# Patient Record
Sex: FEMALE | URBAN - METROPOLITAN AREA
[De-identification: names, ages, dates, MRNs, and addresses within clinical notes are randomized per-mention and may not be internally consistent; named-entity substitution may affect disease eponyms.]

---

## 2021-02-23 ENCOUNTER — MEDICAL CORRESPONDENCE (OUTPATIENT)
Dept: HEALTH INFORMATION MANAGEMENT | Facility: CLINIC | Age: 41
End: 2021-02-23

## 2021-02-24 DIAGNOSIS — Q28.2 CEREBRAL AVM: Primary | ICD-10-CM

## 2021-02-25 ENCOUNTER — TELEPHONE (OUTPATIENT)
Dept: NEUROSURGERY | Facility: CLINIC | Age: 41
End: 2021-02-25

## 2021-03-03 ENCOUNTER — PRE VISIT (OUTPATIENT)
Dept: NEUROSURGERY | Facility: CLINIC | Age: 41
End: 2021-03-03

## 2021-03-03 NOTE — TELEPHONE ENCOUNTER
FUTURE VISIT INFORMATION      FUTURE VISIT INFORMATION:    Date: 3/9/2021    Time: 320pm    [place] [facility] [place]ERR[place] INFORMATION:    Referring provider:  Dr. Florian     Referring providers clinic:  [facility]     Reason for visit/diagnosis  AVM     RECORDS REQUESTED FROM:       Clinic name Comments Records Status Imaging Status   [facility]    [facility] Head-1/13/2021    MR Brain-1/13/2021 Care Everywhere Requested to PACS                                   3/3/2021-Request for Images faxed to [facility]-MR @ 319pm    3/9/2021-[facility] Images now in PACS-MR @ 602am

## 2021-03-09 ENCOUNTER — VIRTUAL VISIT (OUTPATIENT)
Dept: NEUROSURGERY | Facility: CLINIC | Age: 41
End: 2021-03-09
Payer: COMMERCIAL

## 2021-03-09 DIAGNOSIS — Q27.30 AVM (ARTERIOVENOUS MALFORMATION): Primary | ICD-10-CM

## 2021-03-09 PROCEDURE — 99204 OFFICE O/P NEW MOD 45 MIN: CPT | Mod: TEL | Performed by: NEUROLOGICAL SURGERY

## 2021-03-09 NOTE — LETTER
3/9/2021       RE: Malissa Villegas  [address]  [place] [address]     Dear Colleague,    Thank you for referring your patient, Malissa Villegas, to the [facility]UROSUR[facility] [place] at [facility]. Please see a copy of my visit note below.    I had a phone visit with Malissa.  She gave consent for this visit.    Visit duration: 1 hour    Please see dictation for visit details.        Follow up phone call 9/10/2021    This morning I reviewed Malissa's case at our cerebrovascular case conference.  Collectively the group felt that the safest treatment of this AVM was radiosurgery with the intent for cure.  Based on the size of the AVM we would likely perform radiosurgery in two stages.  Ultimately we would expect a 50-70% chance of cure.  If after radiosurgery and time the AVM persisted we would then consider surgical treatment.      At this point with the location of the AVM immediately adjacent to the motor strip we feel there is a real possibility that following surgery she would have transient or even permanent paralysis.  Radiosurgery could provide a possible cure for this AVM.  If it did not we would anticipate the AVM would shrink and if needed any future surgery would then have less risk of resulting in paralysis.    I have relayed all of this to Malissa this morning.  I have told her that we would be happy to participate in the treatment of her AVM but feel that it is good for her to obtain multiple opinions like she is doing.  If after obtaining those opinions she would like to proceed with care at the [facility] she should schedule a follow up appointment with me at which point we will discuss logistics of our treatment plan.    Overall I spent approximately 15 min on the phone with Malissa.    Service Date: 03/09/2021      TELEPHONE VISIT       NEUROSURGERY NOTE      HISTORY OF PRESENT ILLNESS:  I had the pleasure  to talk to Malissa today by telephone during a neurosurgical phone clinic visit.  She gave consent for this visit.      Malissa is a 40-year-old woman originally from [place] who still lives over there.  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      She had been in her normal state of health until more recently when she started having some vision problems.  This was worked up and during that workup was found to have an AVM on MRI.  In retrospect, she does note that over the last couple of years, she has had a couple of spells in which she has blacked out, and she questions whether or not this could be related to the AVM.  She has not had any overt signs of seizures; however, having an AVM in the location in which she does, it is not unusual to have seizures.  She otherwise is apparently asymptomatic from this AVM.      Today I reviewed her MRI that was obtained at [facility] on 01/13/2021, and then I have also reviewed her angiogram that was obtained on 02/18/2021.  The MRI on T2 imaging demonstrates an AVM that is located in the premotor cortex immediately adjacent to the motor cortex.  The AVM appears to extend from the cortical surface down to the lateral ventricle.  The cerebral angiogram demonstrates that there is arterial flow from both the anterior cerebral artery and from the middle cerebral artery.  There are a number of veins draining the AVM, and these all appear to be superficial in nature.  The AVM on the angiogram measures 2.9 cm x 2.2 cm.  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  The supplementary grading scale would add an additional 2 points for being age 40.      I have explained to Malissa that AVMs have about a 4% rate of rupture annually.  Otherwise, this appears to be an asymptomatic AVM.  Treatment  options would include observation, surgical resection, radiosurgery or embolization.  Given the size of the AVM and the complexity of it, I do not feel that an embolization is a reasonable treatment option.  Based on the size and anatomy of the AVM, surgical resection is very reasonable.  However, one needs to take into consideration that this is in the premotor area immediately adjacent to the motor fibers.  As such, I think that there is a real risk of temporary weakness and then even some risk of permanent paralysis.  Alternatively, radiosurgery would be reasonable.  I have told her that it would be ideal if radiosurgery would eliminate the entire AVM; however, there is a chance that it may not, in which point surgical resection would then be planned.  She is going to think about these options and also has an appointment with Dr. Zachery Espinosa tomorrow at [facility].  I believe she may also be seeking an opinion at the [facility].  I told her we would discuss her case at our cerebrovascular case conference tomorrow and make final recommendations and get back to her later tomorrow evening.      Overall, I spent approximately an hour on the phone with Malissa and her sister with the majority of this time spent in consultation and developing a treatment plan.         DRAKE MICHELLE MD             D: 2021   T: 2021   MT: sophy      Name:     MALISSA CHAPARRO   MRN:      [id number]        Account:      [id number]   :      1980           Service Date: 2021      Document: [id number]        Again, thank you for allowing me to participate in the care of your patient.      Sincerely,    Drake Michelle MD

## 2021-03-09 NOTE — PROGRESS NOTES
I had a phone visit with Malissa.  She gave consent for this visit.    Visit duration: 1 hour    Please see dictation for visit details.        Follow up phone call 9/10/2021    This morning I reviewed Malissa's case at our cerebrovascular case conference.  Collectively the group felt that the safest treatment of this AVM was radiosurgery with the intent for cure.  Based on the size of the AVM we would likely perform radiosurgery in two stages.  Ultimately we would expect a 50-70% chance of cure.  If after radiosurgery and time the AVM persisted we would then consider surgical treatment.      At this point with the location of the AVM immediately adjacent to the motor strip we feel there is a real possibility that following surgery she would have transient or even permanent paralysis.  Radiosurgery could provide a possible cure for this AVM.  If it did not we would anticipate the AVM would shrink and if needed any future surgery would then have less risk of resulting in paralysis.    I have relayed all of this to Malissa this morning.  I have told her that we would be happy to participate in the treatment of her AVM but feel that it is good for her to obtain multiple opinions like she is doing.  If after obtaining those opinions she would like to proceed with care at the [facility] she should schedule a follow up appointment with me at which point we will discuss logistics of our treatment plan.    Overall I spent approximately 15 min on the phone with Malissa.

## 2021-03-09 NOTE — PROGRESS NOTES
Service Date: 03/09/2021      TELEPHONE VISIT       NEUROSURGERY NOTE      HISTORY OF PRESENT ILLNESS:  I had the pleasure to talk to Malissa today by telephone during a neurosurgical phone clinic visit.  She gave consent for this visit.      Malissa is a 40-year-old woman originally from [place] who still lives over there.  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      She had been in her normal state of health until more recently when she started having some vision problems.  This was worked up and during that workup was found to have an AVM on MRI.  In retrospect, she does note that over the last couple of years, she has had a couple of spells in which she has blacked out, and she questions whether or not this could be related to the AVM.  She has not had any overt signs of seizures; however, having an AVM in the location in which she does, it is not unusual to have seizures.  She otherwise is apparently asymptomatic from this AVM.      Today I reviewed her MRI that was obtained at [facility] on 01/13/2021, and then I have also reviewed her angiogram that was obtained on 02/18/2021.  The MRI on T2 imaging demonstrates an AVM that is located in the premotor cortex immediately adjacent to the motor cortex.  The AVM appears to extend from the cortical surface down to the lateral ventricle.  The cerebral angiogram demonstrates that there is arterial flow from both the anterior cerebral artery and from the middle cerebral artery.  There are a number of veins draining the AVM, and these all appear to be superficial in nature.  The AVM on the angiogram measures 2.9 cm x 2.2 cm.  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  The supplementary grading scale would add an additional 2 points for being age 40.      I have explained to  Malissa that AVMs have about a 4% rate of rupture annually.  Otherwise, this appears to be an asymptomatic AVM.  Treatment options would include observation, surgical resection, radiosurgery or embolization.  Given the size of the AVM and the complexity of it, I do not feel that an embolization is a reasonable treatment option.  Based on the size and anatomy of the AVM, surgical resection is very reasonable.  However, one needs to take into consideration that this is in the premotor area immediately adjacent to the motor fibers.  As such, I think that there is a real risk of temporary weakness and then even some risk of permanent paralysis.  Alternatively, radiosurgery would be reasonable.  I have told her that it would be ideal if radiosurgery would eliminate the entire AVM; however, there is a chance that it may not, in which point surgical resection would then be planned.  She is going to think about these options and also has an appointment with Dr. Zachery Espinosa tomorrow at [facility].  I believe she may also be seeking an opinion at the [facility].  I told her we would discuss her case at our cerebrovascular case conference tomorrow and make final recommendations and get back to her later tomorrow evening.      Overall, I spent approximately an hour on the phone with Malissa and her sister with the majority of this time spent in consultation and developing a treatment plan.         DRAKE MICHELLE MD             D: 2021   T: 2021   MT: sophy      Name:     MALISSA CHAPARRO   MRN:      [id number]        Account:      [id number]   :      1980           Service Date: 2021      Document: [id number]

## 2021-03-10 NOTE — PATIENT INSTRUCTIONS
Dr Velasco will discuss her case at our cerebrovascular case conference tomorrow and make final recommendations and get back to her later tomorrow evening    Call Jeannine RN for questions/concerns     Thank you for using [facility]

## 2021-03-24 ENCOUNTER — TELEPHONE (OUTPATIENT)
Dept: NEUROLOGY | Facility: CLINIC | Age: 41
End: 2021-03-24

## 2021-03-31 NOTE — TELEPHONE ENCOUNTER
Spoke with patient who reports she plans to go to [place] instead. Joyce Mathews RN 3/31/2021 3:07 PM